# Patient Record
Sex: FEMALE | Race: WHITE
[De-identification: names, ages, dates, MRNs, and addresses within clinical notes are randomized per-mention and may not be internally consistent; named-entity substitution may affect disease eponyms.]

---

## 2019-03-12 ENCOUNTER — HOSPITAL ENCOUNTER (OUTPATIENT)
Dept: HOSPITAL 84 - D.OPS | Age: 54
Discharge: HOME | End: 2019-03-12
Attending: SURGERY
Payer: MEDICAID

## 2019-03-12 DIAGNOSIS — K21.9: Primary | ICD-10-CM

## 2019-03-12 DIAGNOSIS — Z01.812: ICD-10-CM

## 2019-04-17 ENCOUNTER — HOSPITAL ENCOUNTER (INPATIENT)
Dept: HOSPITAL 84 - D.MS | Age: 54
LOS: 3 days | Discharge: HOME | DRG: 908 | End: 2019-04-20
Attending: SURGERY | Admitting: SURGERY
Payer: MEDICAID

## 2019-04-17 VITALS — BODY MASS INDEX: 24.6 KG/M2 | BODY MASS INDEX: 25.1 KG/M2

## 2019-04-17 VITALS — DIASTOLIC BLOOD PRESSURE: 73 MMHG | SYSTOLIC BLOOD PRESSURE: 148 MMHG

## 2019-04-17 VITALS — DIASTOLIC BLOOD PRESSURE: 93 MMHG | SYSTOLIC BLOOD PRESSURE: 165 MMHG

## 2019-04-17 VITALS — SYSTOLIC BLOOD PRESSURE: 156 MMHG | DIASTOLIC BLOOD PRESSURE: 95 MMHG

## 2019-04-17 DIAGNOSIS — K27.7: ICD-10-CM

## 2019-04-17 DIAGNOSIS — K21.9: ICD-10-CM

## 2019-04-17 DIAGNOSIS — D62: ICD-10-CM

## 2019-04-17 DIAGNOSIS — K91.61: Primary | ICD-10-CM

## 2019-04-17 DIAGNOSIS — I10: ICD-10-CM

## 2019-04-17 LAB
ERYTHROCYTE [DISTWIDTH] IN BLOOD BY AUTOMATED COUNT: 14.7 % (ref 11.5–14.5)
HCT VFR BLD CALC: 34 % (ref 36–48)
HCT VFR BLD CALC: 36.7 % (ref 36–48)
HCT VFR BLD CALC: 41.5 % (ref 36–48)
HGB BLD-MCNC: 11.3 G/DL (ref 12–16)
HGB BLD-MCNC: 12.2 G/DL (ref 12–16)
HGB BLD-MCNC: 13.9 G/DL (ref 12–16)
MCH RBC QN AUTO: 28.5 PG (ref 26–34)
MCHC RBC AUTO-ENTMCNC: 33.5 G/DL (ref 31–37)
MCV RBC: 85 FL (ref 80–100)
PLATELET # BLD: 287 10X3/UL (ref 130–400)
PMV BLD AUTO: 8.8 FL (ref 7.4–10.4)
RBC # BLD AUTO: 4.88 10X6/UL (ref 4–5.4)
WBC # BLD AUTO: 13.5 10X3/UL (ref 4.8–10.8)

## 2019-04-17 PROCEDURE — 0DV44ZZ RESTRICTION OF ESOPHAGOGASTRIC JUNCTION, PERCUTANEOUS ENDOSCOPIC APPROACH: ICD-10-PCS | Performed by: SURGERY

## 2019-04-17 PROCEDURE — 0BQT4ZZ REPAIR DIAPHRAGM, PERCUTANEOUS ENDOSCOPIC APPROACH: ICD-10-PCS | Performed by: SURGERY

## 2019-04-17 PROCEDURE — 0DNW4ZZ RELEASE PERITONEUM, PERCUTANEOUS ENDOSCOPIC APPROACH: ICD-10-PCS | Performed by: SURGERY

## 2019-04-17 NOTE — NUR
PT RESTING IN BED. ALERT AND ORIENTED. NO SIGNS OF DISTRESS. BREATHING EVEN
AND UNLABORED. PT STATES NO PROBLEMS AT THIS TIME. IV SITE RT WRIST DRESSING
CLEAN DRY AND INTACT. NO SIGNS OF INFECTION. DRESSINGS ABD CLEAN DRY AND
INTACT. RLQ AND LLQ HYPOACTIVE BOWEL SOUNDS. RUQ AND LUQ BOWEL SOUNDS ABSENT.
NO LOWER LEG SWELLING PRESENT. WILL CONTINUE PLAN OF CARE. CALL LIGHT IN
REACH. BED LOWERED AND LOCKED. BED RAILS UP X2. DESTINEY DRAIN LT LOWER ABD DRESSING
CLEAN DRY AND INTACT.

## 2019-04-18 VITALS — SYSTOLIC BLOOD PRESSURE: 101 MMHG | DIASTOLIC BLOOD PRESSURE: 62 MMHG

## 2019-04-18 VITALS — SYSTOLIC BLOOD PRESSURE: 132 MMHG | DIASTOLIC BLOOD PRESSURE: 73 MMHG

## 2019-04-18 VITALS — DIASTOLIC BLOOD PRESSURE: 91 MMHG | SYSTOLIC BLOOD PRESSURE: 176 MMHG

## 2019-04-18 VITALS — DIASTOLIC BLOOD PRESSURE: 74 MMHG | SYSTOLIC BLOOD PRESSURE: 118 MMHG

## 2019-04-18 VITALS — DIASTOLIC BLOOD PRESSURE: 97 MMHG | SYSTOLIC BLOOD PRESSURE: 171 MMHG

## 2019-04-18 LAB
ALBUMIN SERPL-MCNC: 3.1 G/DL (ref 3.4–5)
ALP SERPL-CCNC: 86 U/L (ref 46–116)
ALT SERPL-CCNC: 130 U/L (ref 10–68)
ANION GAP SERPL CALC-SCNC: 11.9 MMOL/L (ref 8–16)
BASOPHILS NFR BLD AUTO: 0.1 % (ref 0–2)
BILIRUB SERPL-MCNC: 0.39 MG/DL (ref 0.2–1.3)
BUN SERPL-MCNC: 13 MG/DL (ref 7–18)
CALCIUM SERPL-MCNC: 8.4 MG/DL (ref 8.5–10.1)
CHLORIDE SERPL-SCNC: 99 MMOL/L (ref 98–107)
CO2 SERPL-SCNC: 28.7 MMOL/L (ref 21–32)
CREAT SERPL-MCNC: 1.3 MG/DL (ref 0.6–1.3)
EOSINOPHIL NFR BLD: 0 % (ref 0–7)
ERYTHROCYTE [DISTWIDTH] IN BLOOD BY AUTOMATED COUNT: 14.6 % (ref 11.5–14.5)
GLOBULIN SER-MCNC: 3.6 G/L
GLUCOSE SERPL-MCNC: 156 MG/DL (ref 74–106)
HCT VFR BLD CALC: 33.8 % (ref 36–48)
HGB BLD-MCNC: 11.1 G/DL (ref 12–16)
IMM GRANULOCYTES NFR BLD: 0.4 % (ref 0–5)
LYMPHOCYTES NFR BLD AUTO: 8.8 % (ref 15–50)
MCH RBC QN AUTO: 28 PG (ref 26–34)
MCHC RBC AUTO-ENTMCNC: 32.8 G/DL (ref 31–37)
MCV RBC: 85.1 FL (ref 80–100)
MONOCYTES NFR BLD: 4.6 % (ref 2–11)
NEUTROPHILS NFR BLD AUTO: 86.1 % (ref 40–80)
OSMOLALITY SERPL CALC.SUM OF ELEC: 274 MOSM/KG (ref 275–300)
PLATELET # BLD: 335 10X3/UL (ref 130–400)
PMV BLD AUTO: 9.1 FL (ref 7.4–10.4)
POTASSIUM SERPL-SCNC: 3.6 MMOL/L (ref 3.5–5.1)
PROT SERPL-MCNC: 6.7 G/DL (ref 6.4–8.2)
RBC # BLD AUTO: 3.97 10X6/UL (ref 4–5.4)
SODIUM SERPL-SCNC: 136 MMOL/L (ref 136–145)
WBC # BLD AUTO: 19.4 10X3/UL (ref 4.8–10.8)

## 2019-04-18 NOTE — NUR
ALERT AND ORIENTED X 3. LUNGS CLEAR BILATERALLY IN ALL FIELDS. HEART SOUNDS S1
AND S2 HEARD IN ALL FIELDS. BOWEL SOUNDS ACTIVE X 4. DESTINEY DRAIN TO LUQ DRAINING
RED FLUID. LAP SITES TO ABD WITH STERI STRIPS INTACT. NO DRAINAGE FROM LAP
SITES NOTED. IV TO LFA PATENT WITHOUT REDNESS. IV TO RIGHT WRIST PATENT
WITHOUT REDNESS. SKIN INTACT WTIHOUT REDNESS. STATES PAIN 3/10. DENIES NEEDS.
WILL CONTINUE TO MONITOR.

## 2019-04-18 NOTE — NUR
AMBULATED AROUND UNIT X 4. NO DISTRESS NOTED. DESTINEY DRAIN TO LEFT ABD INTACT AND
COMPRESSED WITH DARK BLOODY DRAINAGE NOTED. LAP SITES WITH STERI STRIPS
INTACT.NO DRAINAGE NOTED.ABD TENDER,NON DISTENDED. NO COMPLAINTS VOICED. PCA
DILAUDID IN USE FOR PAIN CONTROL. CL IN REACH

## 2019-04-19 VITALS — SYSTOLIC BLOOD PRESSURE: 114 MMHG | DIASTOLIC BLOOD PRESSURE: 68 MMHG

## 2019-04-19 VITALS — SYSTOLIC BLOOD PRESSURE: 109 MMHG | DIASTOLIC BLOOD PRESSURE: 68 MMHG

## 2019-04-19 VITALS — SYSTOLIC BLOOD PRESSURE: 111 MMHG | DIASTOLIC BLOOD PRESSURE: 69 MMHG

## 2019-04-19 VITALS — SYSTOLIC BLOOD PRESSURE: 87 MMHG | DIASTOLIC BLOOD PRESSURE: 44 MMHG

## 2019-04-19 VITALS — DIASTOLIC BLOOD PRESSURE: 79 MMHG | SYSTOLIC BLOOD PRESSURE: 133 MMHG

## 2019-04-19 VITALS — SYSTOLIC BLOOD PRESSURE: 132 MMHG | DIASTOLIC BLOOD PRESSURE: 75 MMHG

## 2019-04-19 LAB
ANION GAP SERPL CALC-SCNC: 7.3 MMOL/L (ref 8–16)
BASOPHILS NFR BLD AUTO: 0.1 % (ref 0–2)
BUN SERPL-MCNC: 18 MG/DL (ref 7–18)
CALCIUM SERPL-MCNC: 7.8 MG/DL (ref 8.5–10.1)
CHLORIDE SERPL-SCNC: 97 MMOL/L (ref 98–107)
CO2 SERPL-SCNC: 33.3 MMOL/L (ref 21–32)
CREAT SERPL-MCNC: 1 MG/DL (ref 0.6–1.3)
EOSINOPHIL NFR BLD: 0.1 % (ref 0–7)
ERYTHROCYTE [DISTWIDTH] IN BLOOD BY AUTOMATED COUNT: 14.6 % (ref 11.5–14.5)
GLUCOSE SERPL-MCNC: 110 MG/DL (ref 74–106)
HCT VFR BLD CALC: 26.3 % (ref 36–48)
HGB BLD-MCNC: 8.8 G/DL (ref 12–16)
IMM GRANULOCYTES NFR BLD: 0.3 % (ref 0–5)
LYMPHOCYTES NFR BLD AUTO: 11.5 % (ref 15–50)
MCH RBC QN AUTO: 28.2 PG (ref 26–34)
MCHC RBC AUTO-ENTMCNC: 33.5 G/DL (ref 31–37)
MCV RBC: 84.3 FL (ref 80–100)
MONOCYTES NFR BLD: 5.6 % (ref 2–11)
NEUTROPHILS NFR BLD AUTO: 82.4 % (ref 40–80)
OSMOLALITY SERPL CALC.SUM OF ELEC: 270 MOSM/KG (ref 275–300)
PLATELET # BLD: 268 10X3/UL (ref 130–400)
PMV BLD AUTO: 8.9 FL (ref 7.4–10.4)
POTASSIUM SERPL-SCNC: 3.6 MMOL/L (ref 3.5–5.1)
RBC # BLD AUTO: 3.12 10X6/UL (ref 4–5.4)
SODIUM SERPL-SCNC: 134 MMOL/L (ref 136–145)
WBC # BLD AUTO: 17 10X3/UL (ref 4.8–10.8)

## 2019-04-19 NOTE — NUR
SECONDARY IV STARTED TO RT. FOREARM WITH 20G X 1 STICK. WITH 1ST UNIT PRBC'S
INFUSING AT PRESCRIBEDE RATE. NO S/S OF REACTION NOTED AT THIS TIME. STAPLES
INTACT TO ABDOMEN WITH J/P DRAIN INTACT WITH DARK BLOOD NOTED.ENCOURAGED TO
USE CALL LIGHT FOR ASSIST. BS NOTED X4 WITH STATED FLATULANCE

## 2019-04-19 NOTE — NUR
PT ALERT AND ORIENTED. FAMILY AT BEDSIDE. ASKED TO BE UNHOOKED FROM IV MACHINE
SO PT COULD AMBULATE FLOOR WITH FAMILY. PT AMBULATED UNIT 5 TIMES WITH NO
PROBLEM.

## 2019-04-19 NOTE — NUR
FINISHED 1ST UNIT PRBC'S AND STARTED SECOND WITH NO S/S OF REACTION NOTED.
DENIES ANY PAIN OR DISCOMFORT. ENCOURAGED TO USE CALL LIGHT FOR ASSIST

## 2019-04-19 NOTE — NUR
FINISHED SECOND UNIT PRBC'S AND STARTED PLASMA. NO S/S OF REACTION NOTED WITH
DILAUDID 0.5ML GIVEN FOR AGDOMINAL PAIN 5/10.

## 2019-04-20 VITALS — SYSTOLIC BLOOD PRESSURE: 121 MMHG | DIASTOLIC BLOOD PRESSURE: 65 MMHG

## 2019-04-20 VITALS — DIASTOLIC BLOOD PRESSURE: 63 MMHG | SYSTOLIC BLOOD PRESSURE: 111 MMHG

## 2019-04-20 LAB
ANION GAP SERPL CALC-SCNC: 11 MMOL/L (ref 8–16)
BASOPHILS NFR BLD AUTO: 0.1 % (ref 0–2)
BUN SERPL-MCNC: 14 MG/DL (ref 7–18)
CALCIUM SERPL-MCNC: 8.1 MG/DL (ref 8.5–10.1)
CHLORIDE SERPL-SCNC: 101 MMOL/L (ref 98–107)
CO2 SERPL-SCNC: 32.3 MMOL/L (ref 21–32)
CREAT SERPL-MCNC: 1.6 MG/DL (ref 0.6–1.3)
EOSINOPHIL NFR BLD: 0.1 % (ref 0–7)
ERYTHROCYTE [DISTWIDTH] IN BLOOD BY AUTOMATED COUNT: 14.6 % (ref 11.5–14.5)
GLUCOSE SERPL-MCNC: 104 MG/DL (ref 74–106)
HCT VFR BLD CALC: 30.8 % (ref 36–48)
HGB BLD-MCNC: 10.5 G/DL (ref 12–16)
IMM GRANULOCYTES NFR BLD: 0.2 % (ref 0–5)
LYMPHOCYTES NFR BLD AUTO: 16.2 % (ref 15–50)
MCH RBC QN AUTO: 28.6 PG (ref 26–34)
MCHC RBC AUTO-ENTMCNC: 34.1 G/DL (ref 31–37)
MCV RBC: 83.9 FL (ref 80–100)
MONOCYTES NFR BLD: 6.3 % (ref 2–11)
NEUTROPHILS NFR BLD AUTO: 77.1 % (ref 40–80)
OSMOLALITY SERPL CALC.SUM OF ELEC: 281 MOSM/KG (ref 275–300)
PLATELET # BLD: 222 10X3/UL (ref 130–400)
PMV BLD AUTO: 9.3 FL (ref 7.4–10.4)
POTASSIUM SERPL-SCNC: 3.3 MMOL/L (ref 3.5–5.1)
RBC # BLD AUTO: 3.67 10X6/UL (ref 4–5.4)
SODIUM SERPL-SCNC: 141 MMOL/L (ref 136–145)
WBC # BLD AUTO: 10.7 10X3/UL (ref 4.8–10.8)

## 2019-04-20 NOTE — NUR
RESTING WITH UNLABORED BREATHING AND EYES CLOSED.  REAMINS AT BEDSIDE.
IV IN BILATERALY FOREARMS. LEFT ARM SALINE LOCKED. RIGHT FOREARM INFUSING LR @
30 PER ORDER. CALL LIGHT IN REACH. BED RAILS UP X 2.

## 2019-04-20 NOTE — NUR
ALERT AND ORIENTED WITH BS NOTED X4 AND NONTENDER ON PALPATION. STAPLES
INTACT. NORCO GIVEN FOR GENERALIZED DISCOMFORT AFTER ABMULATING 5 LAPS IN
HALLWAY. DESTINEY DRAIN INTACT WITH DARK BROWN SEDEMENT NOTED. ENCOURAGED O USE
CALLL LIGHT FOR ASSIST.

## 2019-04-20 NOTE — NUR
IV'S DISCONTINUED WITH VERBALIZED UNDERSTANDING OF DISCHARGE INSTRUCTIONS.
STABLE AT TIME OF DEPARTURE. RX GIVEN TO PT.

## 2019-10-24 ENCOUNTER — HOSPITAL ENCOUNTER (OUTPATIENT)
Dept: HOSPITAL 84 - D.LABREF | Age: 54
Discharge: HOME | End: 2019-10-24
Attending: INTERNAL MEDICINE
Payer: MEDICAID

## 2019-10-24 VITALS — BODY MASS INDEX: 24.6 KG/M2

## 2019-10-24 DIAGNOSIS — R13.10: Primary | ICD-10-CM

## 2019-10-24 DIAGNOSIS — R63.4: ICD-10-CM

## 2019-10-24 DIAGNOSIS — R14.0: ICD-10-CM

## 2019-10-24 DIAGNOSIS — R11.2: ICD-10-CM

## 2019-10-24 DIAGNOSIS — R10.84: ICD-10-CM

## 2019-10-24 DIAGNOSIS — R14.2: ICD-10-CM
